# Patient Record
Sex: FEMALE | Race: WHITE | NOT HISPANIC OR LATINO
[De-identification: names, ages, dates, MRNs, and addresses within clinical notes are randomized per-mention and may not be internally consistent; named-entity substitution may affect disease eponyms.]

---

## 2020-11-13 ENCOUNTER — RESULT REVIEW (OUTPATIENT)
Age: 45
End: 2020-11-13

## 2021-08-27 ENCOUNTER — NON-APPOINTMENT (OUTPATIENT)
Age: 46
End: 2021-08-27

## 2021-09-01 PROBLEM — Z00.00 ENCOUNTER FOR PREVENTIVE HEALTH EXAMINATION: Status: ACTIVE | Noted: 2021-09-01

## 2021-09-02 ENCOUNTER — APPOINTMENT (OUTPATIENT)
Dept: OBGYN | Facility: CLINIC | Age: 46
End: 2021-09-02
Payer: MEDICAID

## 2021-09-02 ENCOUNTER — NON-APPOINTMENT (OUTPATIENT)
Age: 46
End: 2021-09-02

## 2021-09-02 VITALS
HEIGHT: 62 IN | WEIGHT: 115 LBS | DIASTOLIC BLOOD PRESSURE: 78 MMHG | BODY MASS INDEX: 21.16 KG/M2 | SYSTOLIC BLOOD PRESSURE: 120 MMHG

## 2021-09-02 DIAGNOSIS — T83.32XA DISPLACEMENT OF INTRAUTERINE CONTRACEPTIVE DEVICE, INITIAL ENCOUNTER: ICD-10-CM

## 2021-09-02 DIAGNOSIS — Z01.419 ENCOUNTER FOR GYNECOLOGICAL EXAMINATION (GENERAL) (ROUTINE) W/OUT ABNORMAL FINDINGS: ICD-10-CM

## 2021-09-02 DIAGNOSIS — Z12.31 ENCOUNTER FOR SCREENING MAMMOGRAM FOR MALIGNANT NEOPLASM OF BREAST: ICD-10-CM

## 2021-09-02 DIAGNOSIS — Z87.39 PERSONAL HISTORY OF OTHER DISEASES OF THE MUSCULOSKELETAL SYSTEM AND CONNECTIVE TISSUE: ICD-10-CM

## 2021-09-02 DIAGNOSIS — Z87.898 PERSONAL HISTORY OF OTHER SPECIFIED CONDITIONS: ICD-10-CM

## 2021-09-02 LAB
DATE COLLECTED: NORMAL
HEMOCCULT SP1 STL QL: NEGATIVE
QUALITY CONTROL: YES

## 2021-09-02 PROCEDURE — 82270 OCCULT BLOOD FECES: CPT

## 2021-09-02 PROCEDURE — 99386 PREV VISIT NEW AGE 40-64: CPT | Mod: GY

## 2021-09-02 PROCEDURE — G0101: CPT

## 2021-09-02 RX ORDER — PREGABALIN 150 MG/1
150 CAPSULE ORAL
Refills: 0 | Status: ACTIVE | COMMUNITY

## 2021-09-02 RX ORDER — PANTOPRAZOLE SODIUM 40 MG/1
40 GRANULE, DELAYED RELEASE ORAL
Refills: 0 | Status: ACTIVE | COMMUNITY

## 2021-09-02 RX ORDER — AMITRIPTYLINE HYDROCHLORIDE 25 MG/1
25 TABLET, FILM COATED ORAL
Refills: 0 | Status: ACTIVE | COMMUNITY

## 2021-09-02 RX ORDER — TIZANIDINE HYDROCHLORIDE 4 MG/1
4 CAPSULE ORAL
Refills: 0 | Status: ACTIVE | COMMUNITY

## 2021-09-02 RX ORDER — MORPHINE SULFATE 15 MG/1
15 TABLET ORAL
Refills: 0 | Status: ACTIVE | COMMUNITY
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
## 2021-09-02 NOTE — PHYSICAL EXAM
[Appropriately responsive] : appropriately responsive [Alert] : alert [No Acute Distress] : no acute distress [Soft] : soft [Non-tender] : non-tender [Non-distended] : non-distended [No HSM] : No HSM [Oriented x3] : oriented x3 [Examination Of The Breasts] : a normal appearance [No Discharge] : no discharge [No Masses] : no breast masses were palpable [Labia Majora] : normal [Labia Minora] : normal [No Bleeding] : There was no active vaginal bleeding [Normal] : normal [Uterine Adnexae] : normal [] : implants [FreeTextEntry9] : Guaiac negative

## 2021-09-02 NOTE — END OF VISIT
[FreeTextEntry3] : I, Marilu Hill, solely acted as a scribe for Dr. Ameriac Shoemaker on 09/02/2021 . All medical entries made by the Scribe were at my, Dr. Shoemaker's, direction and personally dictated by me on 09/02/2021. I have reviewed the chart and agree that the record accurately reflects my personal performance of the history, physical exam, assessment and plan. I have also personally directed, reviewed and agreed with the chart.

## 2021-09-02 NOTE — DISCUSSION/SUMMARY
[FreeTextEntry1] : Pap done today. \par \par STI testing declined \par \par IUD could not be removed due to not visualizing the IUD strings. Multiple attempts made with [facility] in cervical cancal. Will need hysteroscopic removal. Will also check pelvic sono. Rx Cytotec sent to the pharmacy. \par \par Prescription for mammogram screening and breast US given. \par \par Self-breast exam reviewed. \par \par She will follow up in 2 weeks for a hysteroscopic removal of an IUD.

## 2021-09-07 LAB
C TRACH RRNA SPEC QL NAA+PROBE: NOT DETECTED
HPV HIGH+LOW RISK DNA PNL CVX: NOT DETECTED
N GONORRHOEA RRNA SPEC QL NAA+PROBE: NOT DETECTED
SOURCE TP AMPLIFICATION: NORMAL

## 2021-09-11 LAB — CYTOLOGY CVX/VAG DOC THIN PREP: NORMAL

## 2021-09-22 ENCOUNTER — APPOINTMENT (OUTPATIENT)
Dept: OBGYN | Facility: CLINIC | Age: 46
End: 2021-09-22
Payer: MEDICARE

## 2021-09-22 ENCOUNTER — ASOB RESULT (OUTPATIENT)
Age: 46
End: 2021-09-22

## 2021-09-22 VITALS
SYSTOLIC BLOOD PRESSURE: 124 MMHG | DIASTOLIC BLOOD PRESSURE: 68 MMHG | HEIGHT: 62 IN | BODY MASS INDEX: 25.4 KG/M2 | WEIGHT: 138 LBS

## 2021-09-22 DIAGNOSIS — Z11.3 ENCOUNTER FOR SCREENING FOR INFECTIONS WITH A PREDOMINANTLY SEXUAL MODE OF TRANSMISSION: ICD-10-CM

## 2021-09-22 DIAGNOSIS — F17.200 NICOTINE DEPENDENCE, UNSPECIFIED, UNCOMPLICATED: ICD-10-CM

## 2021-09-22 DIAGNOSIS — Z78.9 OTHER SPECIFIED HEALTH STATUS: ICD-10-CM

## 2021-09-22 LAB
HCG UR QL: NEGATIVE
QUALITY CONTROL: YES

## 2021-09-22 PROCEDURE — 76376 3D RENDER W/INTRP POSTPROCES: CPT | Mod: 59

## 2021-09-22 PROCEDURE — 81025 URINE PREGNANCY TEST: CPT

## 2021-09-22 PROCEDURE — 58558Z: CUSTOM

## 2021-09-22 PROCEDURE — 76830 TRANSVAGINAL US NON-OB: CPT

## 2021-09-22 PROCEDURE — 58301 REMOVE INTRAUTERINE DEVICE: CPT | Mod: 59

## 2021-09-22 PROCEDURE — 58300 INSERT INTRAUTERINE DEVICE: CPT | Mod: 59

## 2021-09-22 NOTE — PROCEDURE
[Endometrial Biopsy] : Endometrial biopsy [Irregular Bleeding] : irregular uterine bleeding [Guardian] : guardian [Uterine Perforation] : uterine perforation [Pain] : pain [Cytotec] : Cytotec [Betadine] : Betadine [None] : none [Tenaculum] : Tenaculum [Sounded to ___ cm] : sounded to [unfilled] ~Ucm [Moderate] : moderate [Hysteroscopic Retrieval of IUD] : hysteroscopic retrieval [ IUD] :  IUD [Speculum Placed] : speculum placed [Sent to Pathology] : specimen was placed in buffered formalin and sent for pathology [No Complications] : no complications [Hysteroscopy] : Hysteroscopy [Time out performed] : Pre-procedure time out performed.  Patient's name, date of birth and procedure confirmed. [Risks] : risks [Consent Obtained] : Consent obtained [Benefits] : benefits [Alternatives] : alternatives [Patient] : patient [Infection] : infection [Bleeding] : bleeding [Allergic Reaction] : allergic reaction [flexible] : Using aseptic technique a hysteroscopy was performed using a flexible hysteroscope [Tolerated Well] : Patient tolerated the procedure well [Aftercare instructions/regstrictions given and follow-up scheduled] : Aftercare instructions/restrictions given and follow-up scheduled

## 2021-09-22 NOTE — PHYSICAL EXAM
[Appropriately responsive] : appropriately responsive [No Acute Distress] : no acute distress [Alert] : alert [Oriented x3] : oriented x3

## 2021-09-24 PROBLEM — F17.200 CURRENT SOME DAY SMOKER: Status: ACTIVE | Noted: 2021-09-22

## 2021-09-24 PROBLEM — Z11.3 SCREEN FOR STD (SEXUALLY TRANSMITTED DISEASE): Status: RESOLVED | Noted: 2021-09-02 | Resolved: 2021-09-24

## 2021-09-24 PROBLEM — Z78.9 DENIES ALCOHOL CONSUMPTION: Status: ACTIVE | Noted: 2021-09-22

## 2021-09-27 NOTE — DISCUSSION/SUMMARY
[FreeTextEntry1] : EMB hysteroscopy for irregular bleeding done today. Mirena IUD removed and a new Mirena IUD inserted today without complications. Patient tolerated procedures well. Aftercare instructions given. \par \par Pelvic sono today: Anteverted uterus with the IUD seen insitu with 3D modality. Rt ovarian simple cyst 1.7cm. Lt adnexal complex cystic and solid mass 5.5 cm ?dermoid. No free fluid. \par \par Follow up in 6 weeks for repeat pelvic sonogram

## 2021-09-27 NOTE — HISTORY OF PRESENT ILLNESS
[Gonorrhea test offered] : Gonorrhea test offered [Chlamydia test offered] : Chlamydia test offered [HPV test offered] : HPV test offered [IUD] : has an intrauterine device [N] : Patient denies prior pregnancies [Menarche Age: ____] : age at menarche was [unfilled] [Y] : Patient is sexually active [Currently Active] : currently active [Men] : men [Mammogramdate] : 4/13/21 [TextBox_19] : AS PER PT  [BreastSonogramDate] : 5/03/21 [TextBox_25] : AS PER PT  [PapSmeardate] : 9/02/21 [TextBox_31] : NEG [ColonoscopyDate] : 2/14/01 [TextBox_43] : AS PER PT  [GonorrheaDate] : 9/02/21 [TextBox_63] : NEG [ChlamydiaDate] : 9/02/21 [TextBox_68] : NEG [HPVDate] : 9/02/21 [TextBox_78] : NEG [LMPDate] : 2011 [PGHxTotal] : 0 [FreeTextEntry1] : 2011

## 2021-10-05 LAB — CORE LAB BIOPSY: NORMAL

## 2021-11-11 ENCOUNTER — TRANSCRIPTION ENCOUNTER (OUTPATIENT)
Age: 46
End: 2021-11-11

## 2021-11-11 ENCOUNTER — ASOB RESULT (OUTPATIENT)
Age: 46
End: 2021-11-11

## 2021-11-11 ENCOUNTER — APPOINTMENT (OUTPATIENT)
Dept: OBGYN | Facility: CLINIC | Age: 46
End: 2021-11-11
Payer: MEDICARE

## 2021-11-11 VITALS
DIASTOLIC BLOOD PRESSURE: 80 MMHG | SYSTOLIC BLOOD PRESSURE: 114 MMHG | WEIGHT: 115 LBS | BODY MASS INDEX: 21.16 KG/M2 | HEIGHT: 62 IN

## 2021-11-11 DIAGNOSIS — Z12.11 ENCOUNTER FOR SCREENING FOR MALIGNANT NEOPLASM OF COLON: ICD-10-CM

## 2021-11-11 DIAGNOSIS — Z30.430 ENCOUNTER FOR INSERTION OF INTRAUTERINE CONTRACEPTIVE DEVICE: ICD-10-CM

## 2021-11-11 DIAGNOSIS — R92.2 INCONCLUSIVE MAMMOGRAM: ICD-10-CM

## 2021-11-11 DIAGNOSIS — Z87.898 PERSONAL HISTORY OF OTHER SPECIFIED CONDITIONS: ICD-10-CM

## 2021-11-11 PROCEDURE — 36415 COLL VENOUS BLD VENIPUNCTURE: CPT

## 2021-11-11 PROCEDURE — 99213 OFFICE O/P EST LOW 20 MIN: CPT | Mod: 25

## 2021-11-11 PROCEDURE — 76830 TRANSVAGINAL US NON-OB: CPT

## 2021-11-11 PROCEDURE — 76376 3D RENDER W/INTRP POSTPROCES: CPT | Mod: 59

## 2021-11-13 PROBLEM — Z12.11 COLON CANCER SCREENING: Status: RESOLVED | Noted: 2021-09-02 | Resolved: 2021-11-13

## 2021-11-13 PROBLEM — Z87.898 HISTORY OF INTERMENSTRUAL BLEEDING: Status: RESOLVED | Noted: 2021-09-22 | Resolved: 2021-11-13

## 2021-11-13 PROBLEM — R92.2 DENSE BREAST TISSUE ON MAMMOGRAM: Status: RESOLVED | Noted: 2021-09-02 | Resolved: 2021-11-13

## 2021-11-13 PROBLEM — Z30.430 ENCOUNTER FOR INSERTION OF MIRENA IUD: Status: RESOLVED | Noted: 2021-09-22 | Resolved: 2021-11-13

## 2021-11-13 NOTE — DISCUSSION/SUMMARY
[FreeTextEntry1] : On sonogram, a retroverted uterus with the IUD seen insitu with 3D modality. Rt ovary appears within normal limits. Lt adnexal echogenic mass 5.9 cm and cystic mass 3 cm. Dermoid vs ovary mass. No free fluid.\par \par Suspect dermoid.\par \par Recommended to remove the left ovary. Consult with Dr. Read will be scheduled. \par \par KINGSTON blood work collected today. If normal, will check pelvic MRI. If abnormal, will send directly to gyn/onc.\par

## 2021-11-13 NOTE — HISTORY OF PRESENT ILLNESS
[Gonorrhea test offered] : Gonorrhea test offered [Chlamydia test offered] : Chlamydia test offered [HPV test offered] : HPV test offered [IUD] : has an intrauterine device [Y] : Patient is sexually active [N] : Patient denies prior pregnancies [Menarche Age: ____] : age at menarche was [unfilled] [No] : Patient does not have concerns regarding sex [Currently Active] : currently active [Men] : men [Vaginal] : vaginal [Yes] : Yes [FreeTextEntry1] : MARIELA 47 yo  LMP unknown is here today for a sonogram and a follow up.\par \par Denies any pain. [TextBox_4] : PT PRESENTS FOR FOLLOW UP SONOGRAM TO REVIEW RESULTS [Mammogramdate] : 4/13/21 [TextBox_19] : PER PATIENT [BreastSonogramDate] : 5/3/21 [TextBox_25] : PER PATIENT [PapSmeardate] : 9/2/21 [TextBox_31] : NEG [ColonoscopyDate] : 2/14/01 [TextBox_43] : PER PATIENT [GonorrheaDate] : 09/2/21 [TextBox_63] : NEG [ChlamydiaDate] : 09/2/21 [HPVDate] : 09/2/21 [TextBox_68] : NEG [TextBox_78] : NEG [de-identified] : MIRENA IUD PLACED ON 9/22/21 [PGHxTotal] : 0 [FreeTextEntry3] : MIRENA IUD

## 2021-11-13 NOTE — END OF VISIT
[FreeTextEntry3] : I, [Rico Talavera] solely acted as scribe for Dr. America Shoemaker on 11/11/2021 \par All medical entries made by the Scribe were at my, Dr. Shoemaker’s, direction and personally\par dictated by me on 11/11/2021 . I have reviewed the chart and agree that the record\par accurately reflects my personal performance of the history, physical exam, assessment and plan. I\par have also personally directed, reviewed, and agreed with the chart.

## 2021-11-22 ENCOUNTER — APPOINTMENT (OUTPATIENT)
Dept: OBGYN | Facility: CLINIC | Age: 46
End: 2021-11-22
Payer: MEDICARE

## 2021-11-22 VITALS
WEIGHT: 133 LBS | SYSTOLIC BLOOD PRESSURE: 122 MMHG | HEIGHT: 62 IN | DIASTOLIC BLOOD PRESSURE: 70 MMHG | BODY MASS INDEX: 24.48 KG/M2

## 2021-11-22 PROCEDURE — 99214 OFFICE O/P EST MOD 30 MIN: CPT

## 2021-11-27 NOTE — HISTORY OF PRESENT ILLNESS
[LMP unknown] : LMP unknown [IUD] : has an intrauterine device [Y] : Patient is sexually active [N] : Patient denies prior pregnancies [unknown] : Patient is unsure of the date of her LMP [Menarche Age: ____] : age at menarche was [unfilled] [No] : Patient does not have concerns regarding sex [Currently Active] : currently active [Men] : men [Vaginal] : vaginal [Yes] : Yes [FreeTextEntry1] : MARIELA presents today for a surgical consult as per Dr. Shoemaker. Suspected dermoid noted from sonogram at last vist.  [TextBox_4] : Pt presents for a surgical consult [Papeardate] : 09/02/2021 [TextBox_31] : NORMAL [GonorrheaDate] : 09/02/2021 [TextBox_63] : NEG [ChlamydiaDate] : 09/02/2021 [TextBox_68] : NEG [HPVDate] : 09/02/2021 [TextBox_78] : NEG [de-identified] : MIRENA [PGHxTotal] : 0 [FreeTextEntry3] : MIRENA

## 2021-11-27 NOTE — END OF VISIT
[FreeTextEntry3] : I, [Rico Talavera] solely acted as scribe for Dr. Leo Read on 11/22/2021.\par All medical entries made by the Scribe were at my, Dr. Read’s, direction and personally\par dictated by me on 11/22/2021.  I have reviewed the chart and agree that the record\par accurately reflects my personal performance of the history, physical exam, assessment and plan. I\par have also personally directed, reviewed, and agreed with the chart.

## 2021-12-01 ENCOUNTER — NON-APPOINTMENT (OUTPATIENT)
Age: 46
End: 2021-12-01

## 2021-12-02 ENCOUNTER — NON-APPOINTMENT (OUTPATIENT)
Age: 46
End: 2021-12-02

## 2021-12-03 ENCOUNTER — NON-APPOINTMENT (OUTPATIENT)
Age: 46
End: 2021-12-03

## 2021-12-03 LAB
CA 125 (LABCORP): 8.3 U/ML
HE4X: 93.4 PMOL/L
POSTMENOPAUSAL ROMA: 1.39
PREMENOPAUSAL ROMA: 2.55
ROMA COMMENT: NORMAL

## 2021-12-08 ENCOUNTER — APPOINTMENT (OUTPATIENT)
Dept: GYNECOLOGIC ONCOLOGY | Facility: CLINIC | Age: 46
End: 2021-12-08
Payer: MEDICARE

## 2021-12-08 DIAGNOSIS — Z80.3 FAMILY HISTORY OF MALIGNANT NEOPLASM OF BREAST: ICD-10-CM

## 2021-12-08 PROCEDURE — 76857 US EXAM PELVIC LIMITED: CPT | Mod: 59

## 2021-12-08 PROCEDURE — 76830 TRANSVAGINAL US NON-OB: CPT | Mod: 59

## 2021-12-08 PROCEDURE — 99204 OFFICE O/P NEW MOD 45 MIN: CPT | Mod: 25

## 2021-12-14 NOTE — ASSESSMENT
[FreeTextEntry1] : 47yo female with left adnexal mass ?dermoid and elevated KINGSTON. Discussed with patient that given her elevated KINGSTON and the appearance of this mass, surgical excision is recommended. Option of Lap LO, BS vs Lap LO, BS hysterectomy vs Lap LO, BS, possible staging possible hysterectomy. Pt would like to proceed with just removal of left tube and ovary and if a malignancy is found then staging and hysterectomy. \par \par I discussed at length with the patient the nature, purpose, risks, benefits, and alternatives of laparoscopic left oophorectomy, bilateral salpingectomy, possible hysterectomy, possible right oophorectomy, possible staging (including possible omentectomy).  The patient understands the risks to include,but not be limited to, bowel injury, bleeding (with the possible need for transfusion), bladder or ureteral injury, infections, deep venous thrombosis, and kimmy-operative death.  The patient understands the utility of intraoperative frozen section to determine whether surgical staging will be performed. The patient also understands that her surgery may not be able to be performed robotically and that she may need a laparotomy.  She also understands the limitations of robotic surgery and the possibility of missing a surgical complication with need for subsequent re-exploration.  She agrees to proceed.  She asked numerous questions which were answered to her satisfaction.  She understands the need for a pre-operative bowel preparation and agrees to comply with our instructions.  She also understands the rationale for a cystoscopy at the completion of the procedure and the potential risks of cystoscopy.  The patient also understands the possible limitations of frozen section and the limitations of robotic staging compared to a laparotomy approach.\par

## 2021-12-14 NOTE — PHYSICAL EXAM
[Chaperone Present] : A chaperone was present in the examining room during all aspects of the physical examination [Abnormal] : Adnexa(ae): Abnormal [Normal] : Bimanual Exam: Normal [FreeTextEntry1] : Patient was interviewed and examined with chaperone present. Name of chaperone: Haley Suarez  [de-identified] : smooth left adnexal mass

## 2021-12-14 NOTE — END OF VISIT
[FreeTextEntry3] : Written by Haley TORIBIO, acting as a scribe for Dr. Abraham Britt.\par This note accurately reflects the work and decisions made by me.\par

## 2021-12-14 NOTE — CHIEF COMPLAINT
[FreeTextEntry1] : [facility]\par \par [facility]\par [address]\par [place] [address] \par [phone] \par \par

## 2021-12-14 NOTE — HISTORY OF PRESENT ILLNESS
[FreeTextEntry1] : This 45yo G0 LMP 10 years ago referred by Dr. Shoemaker for evaluation of an ovarian cyst and elevated KINGSTON. Patient had a routine pelvic US in Sept for removal of IUD and a left adnexal complex cystic and solid mass 5.5cm seen ? dermoid. A follow up sono on 11/21/21 revealed RV uterus with IUD, left adnexal echogenic mass 5.9cm and cystic mass 3cm ?dermoid, free fluid. Premenopausal KINGSTON on 11/11/21 was elevated at 2.55. \par \par Pt denies pelvic pain, n/v, bloating or change in bowel or bladder habits. She has a Mirena IUD in place. Denies abnormal bleeding. \par \par Family history of breast cancer in her mat GM and mat aunt. 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  \par \par Health maintenance:\par \par Pap smear-9/2/21- wnl \par Mammo-4/2021-reports wnl \par Colonoscopy-years ago \par \par

## 2022-01-01 NOTE — DISCUSSION/SUMMARY
[FreeTextEntry1] : -We discussed and reviewed the results of the pelvic sonogram ordered by Dr. Shoemaker. \par \par -Recommended to remove her left ovary at her age. Advised right ovary will be examined and the option of removing it or leaving it was discussed. . Patient desires a tubal ligation while in surgery. Procedure details were discussed today. Patient was advised the procedure can be done at [facility] or . \par \par -Advised patient there is a risk of ovarian torsion. \par \par -Surgical scheduler will meet with patient to schedule a date for surgery. \par \par -All questions and concerns were discussed.\par \par During this visit 20 minutes were spent face-to-face with greater than 50% of the time dedicated\par to counseling.
alert and awake

## 2022-01-13 ENCOUNTER — APPOINTMENT (OUTPATIENT)
Dept: OBGYN | Facility: CLINIC | Age: 47
End: 2022-01-13

## 2022-01-27 ENCOUNTER — APPOINTMENT (OUTPATIENT)
Dept: OBGYN | Facility: CLINIC | Age: 47
End: 2022-01-27

## 2022-01-29 ENCOUNTER — TRANSCRIPTION ENCOUNTER (OUTPATIENT)
Age: 47
End: 2022-01-29

## 2022-02-07 ENCOUNTER — FORM ENCOUNTER (OUTPATIENT)
Age: 47
End: 2022-02-07

## 2022-02-10 ENCOUNTER — RESULT REVIEW (OUTPATIENT)
Age: 47
End: 2022-02-10

## 2022-02-15 ENCOUNTER — NON-APPOINTMENT (OUTPATIENT)
Age: 47
End: 2022-02-15

## 2022-02-17 ENCOUNTER — NON-APPOINTMENT (OUTPATIENT)
Age: 47
End: 2022-02-17

## 2022-02-24 ENCOUNTER — APPOINTMENT (OUTPATIENT)
Dept: GYNECOLOGIC ONCOLOGY | Facility: CLINIC | Age: 47
End: 2022-02-24
Payer: MEDICARE

## 2022-02-24 VITALS — HEIGHT: 62 IN | BODY MASS INDEX: 22.45 KG/M2 | WEIGHT: 122 LBS

## 2022-02-24 PROCEDURE — 99212 OFFICE O/P EST SF 10 MIN: CPT
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
## 2022-02-24 NOTE — DISCUSSION/SUMMARY
[Clean] : was clean [Dry] : was dry [Intact] : was intact [Erythema] : was not erythematous [Ecchymosis] : was not ecchymotic [Seroma] : had no seroma [None] : had no drainage [Normal Skin] : normal appearance [Firm] : soft [Tender] : nontender [Abnormal Bowel Sounds] : normal bowel sounds [Guarding] : no guarding [Rebound] : no rebound tenderness [Incisional Hernia] : no incisional hernia [Mass] : no palpable mass [Doing Well] : is doing well [Excellent Pain Control] : has excellent pain control [No Sign of Infection] : is showing no signs of infection

## 2022-02-24 NOTE — ASSESSMENT
[FreeTextEntry1] : Pt is a 47 yo s/p Lap BS LO on 2/10/22 due to left dermoid cyst. Pathology with no immature elements. Recovered well.

## 2022-03-18 ENCOUNTER — TRANSCRIPTION ENCOUNTER (OUTPATIENT)
Age: 47
End: 2022-03-18

## 2022-08-27 ENCOUNTER — NON-APPOINTMENT (OUTPATIENT)
Age: 47
End: 2022-08-27

## 2022-09-02 ENCOUNTER — APPOINTMENT (OUTPATIENT)
Dept: OBGYN | Facility: CLINIC | Age: 47
End: 2022-09-02

## 2022-09-02 VITALS
BODY MASS INDEX: 26.87 KG/M2 | WEIGHT: 146 LBS | DIASTOLIC BLOOD PRESSURE: 101 MMHG | SYSTOLIC BLOOD PRESSURE: 145 MMHG | HEIGHT: 62 IN

## 2022-09-02 DIAGNOSIS — Z12.31 ENCOUNTER FOR SCREENING MAMMOGRAM FOR MALIGNANT NEOPLASM OF BREAST: ICD-10-CM

## 2022-09-02 PROCEDURE — 99396 PREV VISIT EST AGE 40-64: CPT

## 2022-09-02 PROCEDURE — 82270 OCCULT BLOOD FECES: CPT

## 2022-09-05 LAB
DATE COLLECTED: NORMAL
HEMOCCULT SP1 STL QL: NEGATIVE
HPV HIGH+LOW RISK DNA PNL CVX: NOT DETECTED
QUALITY CONTROL: YES

## 2022-09-05 NOTE — HISTORY OF PRESENT ILLNESS
[IUD] : has an intrauterine device [Y] : Patient is sexually active [N] : Patient denies prior pregnancies [Menarche Age: ____] : age at menarche was [unfilled] [LMP unknown] : LMP unknown [unknown] : Patient is unsure of the date of her LMP [Currently Active] : currently active [FreeTextEntry1] : \par 47 yo LMP unsure secondary to IUD here for an annual exam.  [Mammogramdate] : 6/2022 [BreastSonogramDate] : 6/2022 [PapSmeardate] : 9/2/21 [TextBox_31] : WNL [ColonoscopyDate] : 15 years ago [GonorrheaDate] : 9/2/21 [TextBox_63] : NEG [ChlamydiaDate] : 9/2/21 [TextBox_68] : NEG [HPVDate] : 9/2/21 [TextBox_78] : NEG [de-identified] : TRISTAN 9/22/21 [PGHxTotal] : 0

## 2022-09-06 ENCOUNTER — NON-APPOINTMENT (OUTPATIENT)
Age: 47
End: 2022-09-06

## 2022-09-08 ENCOUNTER — APPOINTMENT (OUTPATIENT)
Dept: OBGYN | Facility: CLINIC | Age: 47
End: 2022-09-08

## 2022-09-10 LAB — CYTOLOGY CVX/VAG DOC THIN PREP: NORMAL

## 2022-09-12 ENCOUNTER — TRANSCRIPTION ENCOUNTER (OUTPATIENT)
Age: 47
End: 2022-09-12

## 2022-10-10 ENCOUNTER — APPOINTMENT (OUTPATIENT)
Dept: OPHTHALMOLOGY | Facility: CLINIC | Age: 47
End: 2022-10-10

## 2022-10-10 ENCOUNTER — NON-APPOINTMENT (OUTPATIENT)
Age: 47
End: 2022-10-10

## 2022-10-10 PROCEDURE — 92004 COMPRE OPH EXAM NEW PT 1/>: CPT

## 2023-01-26 ENCOUNTER — APPOINTMENT (OUTPATIENT)
Dept: OPHTHALMOLOGY | Facility: CLINIC | Age: 48
End: 2023-01-26
Payer: MEDICARE

## 2023-01-26 ENCOUNTER — NON-APPOINTMENT (OUTPATIENT)
Age: 48
End: 2023-01-26

## 2023-01-26 PROCEDURE — 99213 OFFICE O/P EST LOW 20 MIN: CPT

## 2023-06-10 ENCOUNTER — NON-APPOINTMENT (OUTPATIENT)
Age: 48
End: 2023-06-10

## 2023-06-13 ENCOUNTER — TRANSCRIPTION ENCOUNTER (OUTPATIENT)
Age: 48
End: 2023-06-13

## 2023-07-05 ENCOUNTER — TRANSCRIPTION ENCOUNTER (OUTPATIENT)
Age: 48
End: 2023-07-05

## 2023-08-04 ENCOUNTER — TRANSCRIPTION ENCOUNTER (OUTPATIENT)
Age: 48
End: 2023-08-04

## 2023-08-30 ENCOUNTER — NON-APPOINTMENT (OUTPATIENT)
Age: 48
End: 2023-08-30

## 2023-09-27 ENCOUNTER — NON-APPOINTMENT (OUTPATIENT)
Age: 48
End: 2023-09-27

## 2023-09-27 ENCOUNTER — APPOINTMENT (OUTPATIENT)
Dept: OPHTHALMOLOGY | Facility: CLINIC | Age: 48
End: 2023-09-27
Payer: MEDICARE

## 2023-09-27 PROCEDURE — 92133 CPTRZD OPH DX IMG PST SGM ON: CPT

## 2023-09-27 PROCEDURE — 92014 COMPRE OPH EXAM EST PT 1/>: CPT

## 2023-10-18 ENCOUNTER — LABORATORY RESULT (OUTPATIENT)
Age: 48
End: 2023-10-18

## 2023-10-18 ENCOUNTER — NON-APPOINTMENT (OUTPATIENT)
Age: 48
End: 2023-10-18

## 2023-10-18 ENCOUNTER — APPOINTMENT (OUTPATIENT)
Dept: OBGYN | Facility: CLINIC | Age: 48
End: 2023-10-18
Payer: MEDICARE

## 2023-10-18 DIAGNOSIS — R92.333 MAMMOGRAPHIC HETEROGENEOUS DENSITY, BILATERAL BREASTS: ICD-10-CM

## 2023-10-18 DIAGNOSIS — Z01.419 ENCOUNTER FOR GYNECOLOGICAL EXAMINATION (GENERAL) (ROUTINE) W/OUT ABNORMAL FINDINGS: ICD-10-CM

## 2023-10-18 DIAGNOSIS — Z12.31 ENCOUNTER FOR SCREENING MAMMOGRAM FOR MALIGNANT NEOPLASM OF BREAST: ICD-10-CM

## 2023-10-18 DIAGNOSIS — N94.89 OTHER SPECIFIED CONDITIONS ASSOCIATED WITH FEMALE GENITAL ORGANS AND MENSTRUAL CYCLE: ICD-10-CM

## 2023-10-18 DIAGNOSIS — R92.30 DENSE BREASTS, UNSPECIFIED: ICD-10-CM

## 2023-10-18 DIAGNOSIS — Z12.11 ENCOUNTER FOR SCREENING FOR MALIGNANT NEOPLASM OF COLON: ICD-10-CM

## 2023-10-18 DIAGNOSIS — T83.32XA DISPLACEMENT OF INTRAUTERINE CONTRACEPTIVE DEVICE, INITIAL ENCOUNTER: ICD-10-CM

## 2023-10-18 PROCEDURE — 99396 PREV VISIT EST AGE 40-64: CPT | Mod: GY

## 2023-10-18 RX ORDER — NEBIVOLOL HYDROCHLORIDE 10 MG/1
10 TABLET ORAL
Refills: 0 | Status: ACTIVE | COMMUNITY

## 2023-10-18 RX ORDER — FAMOTIDINE 40 MG
40 TABLET,DISINTEGRATING ORAL
Refills: 0 | Status: ACTIVE | COMMUNITY

## 2023-10-18 RX ORDER — BUDESONIDE AND FORMOTEROL FUMARATE DIHYDRATE 160; 4.5 UG/1; UG/1
AEROSOL RESPIRATORY (INHALATION)
Refills: 0 | Status: ACTIVE | COMMUNITY

## 2023-11-05 LAB
CYTOLOGY CVX/VAG DOC THIN PREP: ABNORMAL
HPV HIGH+LOW RISK DNA PNL CVX: DETECTED

## 2023-11-08 ENCOUNTER — NON-APPOINTMENT (OUTPATIENT)
Age: 48
End: 2023-11-08

## 2023-11-09 ENCOUNTER — NON-APPOINTMENT (OUTPATIENT)
Age: 48
End: 2023-11-09

## 2023-11-13 ENCOUNTER — NON-APPOINTMENT (OUTPATIENT)
Age: 48
End: 2023-11-13

## 2023-11-15 ENCOUNTER — NON-APPOINTMENT (OUTPATIENT)
Age: 48
End: 2023-11-15

## 2023-12-13 ENCOUNTER — LABORATORY RESULT (OUTPATIENT)
Age: 48
End: 2023-12-13

## 2023-12-13 ENCOUNTER — APPOINTMENT (OUTPATIENT)
Dept: OBGYN | Facility: CLINIC | Age: 48
End: 2023-12-13
Payer: MEDICARE

## 2023-12-13 VITALS
DIASTOLIC BLOOD PRESSURE: 78 MMHG | HEIGHT: 62 IN | BODY MASS INDEX: 28.34 KG/M2 | WEIGHT: 154 LBS | SYSTOLIC BLOOD PRESSURE: 132 MMHG

## 2023-12-13 PROCEDURE — 99215 OFFICE O/P EST HI 40 MIN: CPT

## 2023-12-13 PROCEDURE — 36415 COLL VENOUS BLD VENIPUNCTURE: CPT

## 2023-12-13 RX ORDER — METHIMAZOLE 5 MG/1
5 TABLET ORAL
Refills: 0 | Status: ACTIVE | COMMUNITY

## 2023-12-13 RX ORDER — METOPROLOL SUCCINATE 100 MG/1
TABLET, EXTENDED RELEASE ORAL
Refills: 0 | Status: DISCONTINUED | COMMUNITY
End: 2023-12-13

## 2023-12-13 RX ORDER — PHENTERMINE HYDROCHLORIDE 15 MG/1
15 CAPSULE ORAL
Qty: 30 | Refills: 0 | Status: ACTIVE | COMMUNITY
Start: 2023-12-13 | End: 1900-01-01

## 2023-12-13 RX ORDER — LOSARTAN POTASSIUM AND HYDROCHLOROTHIAZIDE 12.5; 5 MG/1; MG/1
50-12.5 TABLET ORAL
Refills: 0 | Status: DISCONTINUED | COMMUNITY
End: 2023-12-13

## 2023-12-13 RX ORDER — MISOPROSTOL 200 UG/1
200 TABLET ORAL
Qty: 2 | Refills: 0 | Status: DISCONTINUED | COMMUNITY
Start: 2021-09-21 | End: 2023-12-13

## 2023-12-21 ENCOUNTER — APPOINTMENT (OUTPATIENT)
Dept: OBGYN | Facility: CLINIC | Age: 48
End: 2023-12-21
Payer: MEDICARE

## 2023-12-21 VITALS
BODY MASS INDEX: 28.34 KG/M2 | DIASTOLIC BLOOD PRESSURE: 80 MMHG | WEIGHT: 154 LBS | HEIGHT: 62 IN | SYSTOLIC BLOOD PRESSURE: 148 MMHG

## 2023-12-21 DIAGNOSIS — Z01.419 ENCOUNTER FOR GYNECOLOGICAL EXAMINATION (GENERAL) (ROUTINE) W/OUT ABNORMAL FINDINGS: ICD-10-CM

## 2023-12-21 DIAGNOSIS — R87.810 ATYPICAL SQUAMOUS CELLS OF UNDETERMINED SIGNIFICANCE ON CYTOLOGIC SMEAR OF CERVIX (ASC-US): ICD-10-CM

## 2023-12-21 DIAGNOSIS — R87.610 ATYPICAL SQUAMOUS CELLS OF UNDETERMINED SIGNIFICANCE ON CYTOLOGIC SMEAR OF CERVIX (ASC-US): ICD-10-CM

## 2023-12-21 LAB
HCG UR QL: NEGATIVE
QUALITY CONTROL: YES

## 2023-12-21 PROCEDURE — 36415 COLL VENOUS BLD VENIPUNCTURE: CPT

## 2023-12-21 PROCEDURE — 99213 OFFICE O/P EST LOW 20 MIN: CPT

## 2023-12-21 PROCEDURE — 81025 URINE PREGNANCY TEST: CPT

## 2023-12-22 LAB
PROLACTIN SERPL-MCNC: 13.1 NG/ML
TSH SERPL-ACNC: 1.08 UIU/ML

## 2023-12-27 DIAGNOSIS — R79.89 OTHER SPECIFIED ABNORMAL FINDINGS OF BLOOD CHEMISTRY: ICD-10-CM

## 2023-12-27 PROBLEM — Z01.419 ENCOUNTER FOR WELL WOMAN EXAM WITH ROUTINE GYNECOLOGICAL EXAM: Status: RESOLVED | Noted: 2023-10-18 | Resolved: 2023-12-27

## 2023-12-27 LAB
24R-OH-CALCIDIOL SERPL-MCNC: 49.9 PG/ML
ALT SERPL-CCNC: 10 U/L
ANION GAP SERPL CALC-SCNC: 19 MMOL/L
AST SERPL-CCNC: 14 U/L
CHLORIDE SERPL-SCNC: 101 MMOL/L
CHOLEST SERPL-MCNC: 213 MG/DL
CO2 SERPL-SCNC: 20 MMOL/L
CRP SERPL-MCNC: 10 MG/L
DHEA-SULFATE, SERUM: 40 UG/DL
ESTIMATED AVERAGE GLUCOSE: 103 MG/DL
ESTROGEN SERPL-MCNC: 777 PG/ML
FSH SERPL-MCNC: 20.7 IU/L
HBA1C MFR BLD HPLC: 5.2 %
HDLC SERPL-MCNC: 79 MG/DL
INSULIN SERPL-MCNC: 4.3 UU/ML
LDLC SERPL DIRECT ASSAY-MCNC: 125 MG/DL
LH SERPL-ACNC: 26.2 IU/L
POTASSIUM SERPL-SCNC: 5.2 MMOL/L
PROGEST SERPL-MCNC: 0.5 NG/ML
PROLACTIN SERPL-MCNC: 44.2 NG/ML
SODIUM SERPL-SCNC: 140 MMOL/L
T3 SERPL-MCNC: 128 NG/DL
TESTOST SERPL-MCNC: <2.5 NG/DL
TSH SERPL-ACNC: 7.09 UIU/ML
VIT B12 SERPL-MCNC: 420 PG/ML

## 2023-12-27 NOTE — END OF VISIT
[FreeTextEntry3] : I, Ankita Mercado, solely acted as scribe for Dr. America Shoemaker on 12/21/2023. All medical entries made by the Scribe were at my, Dr. Shoemaker's, direction and personally dictated by me on 12/21/2023. I have reviewed the chart and agree that the record accurately reflects my personal performance of the history, physical exam, assessment and plan. I have also personally directed, reviewed, and agreed with the chart.
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
## 2023-12-27 NOTE — DISCUSSION/SUMMARY
[FreeTextEntry1] : Discussed previous bloodwork results with patient today including elevated prolactin.   Repeat Prolactin and TSH bloodwork today.  Patient declines colposcopy today and would prefer to repeat a pap smear yearly instead. She is aware of the risks.  F/u annually and as needed.

## 2023-12-27 NOTE — HISTORY OF PRESENT ILLNESS
[N] : Patient reports normal menses [IUD] : has an intrauterine device [Y] : Patient is sexually active [Menarche Age: ____] : age at menarche was [unfilled] [Currently Active] : currently active [FreeTextEntry1] : MARIELA 49 yo  LMP unknown is here today for a colposcopy secondary to positive HPV & ASCUS.  Patient declines colposcopy today and would prefer to repeat a pap smear yearly instead. She is aware of the risks. [Mammogramdate] : 06/16/23 [TextBox_19] : BR2 [BreastSonogramDate] : 06/16/23 [TextBox_25] : BR2 [PapSmeardate] : 10/18/23 [TextBox_31] : ASC-US [ColonoscopyDate] : 2022  [TextBox_43] : PER PT [HPVDate] : 10/18/23 [TextBox_78] : POS, HPV16&18/45-NEG [de-identified] : TRISTAN (09/22/2021)

## 2024-01-26 ENCOUNTER — APPOINTMENT (OUTPATIENT)
Dept: OBGYN | Facility: CLINIC | Age: 49
End: 2024-01-26
Payer: MEDICARE

## 2024-01-26 VITALS
SYSTOLIC BLOOD PRESSURE: 122 MMHG | HEIGHT: 62 IN | DIASTOLIC BLOOD PRESSURE: 78 MMHG | WEIGHT: 154 LBS | BODY MASS INDEX: 28.34 KG/M2

## 2024-01-26 DIAGNOSIS — Z97.5 PRESENCE OF (INTRAUTERINE) CONTRACEPTIVE DEVICE: ICD-10-CM

## 2024-01-26 PROCEDURE — 99213 OFFICE O/P EST LOW 20 MIN: CPT

## 2024-02-23 ENCOUNTER — APPOINTMENT (OUTPATIENT)
Dept: OBGYN | Facility: CLINIC | Age: 49
End: 2024-02-23

## 2024-03-07 PROBLEM — Z97.5 IUD (INTRAUTERINE DEVICE) IN PLACE: Status: ACTIVE | Noted: 2021-09-02

## 2024-03-07 NOTE — ASSESSMENT
[FreeTextEntry1] : MARIELA JAIMES  is here for an appointment for a followup of weight loss medications.    BARIATRIC SURGERY HISTORY:none OBESITY COMORBIDITIES: none CURRENT ANTI-OBESITY MEDICATIONS: OBESITY MEDICATION SIDE EFFECTS: HISTORY OF WEIGHT LOSS MEDICATIONS:   Goal Weight: Patient Current weight: 154-->154 BMI:28.17   Portion size: normal size according to patient, Breakfast: carnation instant breakfast shakes Lunch: avocado/ trail mix Dinner: chicken,vegetables, (broccoli, carrots, yam), pizza 1x/weekly Snacks: stopped snacking on the all the candy, 3 fun size chocolate, fruits Drinks: water, sugar free soda ( 3x weekly),    Exercise: cardio 3-4x/ week, training with specialist Occupation: unemployed  Plan of Care:  Whole food eating based strategy. Increase diversity of plants/fiber in diet Stressed regular scheduled meal w/ snacks.  Insurance does not cover any weight loss medication.  To continue phentermine out of pocket.

## 2024-03-08 ENCOUNTER — APPOINTMENT (OUTPATIENT)
Dept: OBGYN | Facility: CLINIC | Age: 49
End: 2024-03-08
Payer: MEDICARE

## 2024-03-08 VITALS
WEIGHT: 148.4 LBS | HEIGHT: 62 IN | BODY MASS INDEX: 27.31 KG/M2 | DIASTOLIC BLOOD PRESSURE: 90 MMHG | SYSTOLIC BLOOD PRESSURE: 130 MMHG

## 2024-03-08 DIAGNOSIS — I10 ESSENTIAL (PRIMARY) HYPERTENSION: ICD-10-CM

## 2024-03-08 DIAGNOSIS — E66.3 OVERWEIGHT: ICD-10-CM

## 2024-03-08 DIAGNOSIS — E66.9 OVERWEIGHT: ICD-10-CM

## 2024-03-08 PROCEDURE — 99213 OFFICE O/P EST LOW 20 MIN: CPT

## 2024-03-08 RX ORDER — PHENTERMINE HYDROCHLORIDE 37.5 MG/1
37.5 CAPSULE ORAL
Qty: 30 | Refills: 0 | Status: ACTIVE | COMMUNITY
Start: 2024-01-26 | End: 1900-01-01

## 2024-03-08 NOTE — ASSESSMENT
[FreeTextEntry1] : MARIELA JAIMES  is here for an appointment for a followup of weight loss medications.    BARIATRIC SURGERY HISTORY:none OBESITY COMORBIDITIES: none CURRENT ANTI-OBESITY MEDICATIONS: phentermine OBESITY MEDICATION SIDE EFFECTS: HISTORY OF WEIGHT LOSS MEDICATIONS:  Patient Current weight: 154-->154-->148 BMI:28.17-->27.1   Portion size: normal size according to patient, Breakfast: carnation instant breakfast shakes, having issues being consistent with breakfast, english muffin w/ PB Lunch: avocado/ trail mix/fruit/cottage cheese Dinner: chicken,vegetables, (broccoli, carrots, yam), pizza 1x/weekly Snacks: stopped snacking on the all the candy, nothing after dinner Drinks: water, sugar free soda ( 3x weekly),    Exercise: cardio 3-4x/ week, Occupation: unemployed  Plan of Care:  Whole food eating based strategy. Increase diversity of plants/fiber in diet Stressed regular scheduled meal w/ snacks.  To continue phentermine out of pocket patient aware of being used off label. Will plan to d/c by May 2024.

## 2024-04-19 ENCOUNTER — APPOINTMENT (OUTPATIENT)
Dept: OBGYN | Facility: CLINIC | Age: 49
End: 2024-04-19

## 2024-06-28 ENCOUNTER — RESULT REVIEW (OUTPATIENT)
Age: 49
End: 2024-06-28

## 2024-10-24 ENCOUNTER — NON-APPOINTMENT (OUTPATIENT)
Age: 49
End: 2024-10-24

## 2024-10-24 ENCOUNTER — APPOINTMENT (OUTPATIENT)
Dept: OPHTHALMOLOGY | Facility: CLINIC | Age: 49
End: 2024-10-24
Payer: MEDICARE

## 2024-10-24 PROCEDURE — 92014 COMPRE OPH EXAM EST PT 1/>: CPT

## 2024-11-15 ENCOUNTER — APPOINTMENT (OUTPATIENT)
Dept: OBGYN | Facility: CLINIC | Age: 49
End: 2024-11-15
Payer: MEDICARE

## 2024-11-15 ENCOUNTER — LABORATORY RESULT (OUTPATIENT)
Age: 49
End: 2024-11-15

## 2024-11-15 VITALS
SYSTOLIC BLOOD PRESSURE: 116 MMHG | WEIGHT: 142 LBS | DIASTOLIC BLOOD PRESSURE: 70 MMHG | BODY MASS INDEX: 26.13 KG/M2 | HEIGHT: 62 IN

## 2024-11-15 DIAGNOSIS — Z97.5 PRESENCE OF (INTRAUTERINE) CONTRACEPTIVE DEVICE: ICD-10-CM

## 2024-11-15 DIAGNOSIS — Z12.31 ENCOUNTER FOR SCREENING MAMMOGRAM FOR MALIGNANT NEOPLASM OF BREAST: ICD-10-CM

## 2024-11-15 DIAGNOSIS — Z82.49 FAMILY HISTORY OF ISCHEMIC HEART DISEASE AND OTHER DISEASES OF THE CIRCULATORY SYSTEM: ICD-10-CM

## 2024-11-15 DIAGNOSIS — Z80.1 FAMILY HISTORY OF MALIGNANT NEOPLASM OF TRACHEA, BRONCHUS AND LUNG: ICD-10-CM

## 2024-11-15 DIAGNOSIS — R92.30 DENSE BREASTS, UNSPECIFIED: ICD-10-CM

## 2024-11-15 DIAGNOSIS — Z78.9 OTHER SPECIFIED HEALTH STATUS: ICD-10-CM

## 2024-11-15 DIAGNOSIS — R87.610 ATYPICAL SQUAMOUS CELLS OF UNDETERMINED SIGNIFICANCE ON CYTOLOGIC SMEAR OF CERVIX (ASC-US): ICD-10-CM

## 2024-11-15 DIAGNOSIS — Z01.419 ENCOUNTER FOR GYNECOLOGICAL EXAMINATION (GENERAL) (ROUTINE) W/OUT ABNORMAL FINDINGS: ICD-10-CM

## 2024-11-15 DIAGNOSIS — Z83.3 FAMILY HISTORY OF DIABETES MELLITUS: ICD-10-CM

## 2024-11-15 DIAGNOSIS — R87.810 ATYPICAL SQUAMOUS CELLS OF UNDETERMINED SIGNIFICANCE ON CYTOLOGIC SMEAR OF CERVIX (ASC-US): ICD-10-CM

## 2024-11-15 DIAGNOSIS — R87.810 CERVICAL HIGH RISK HUMAN PAPILLOMAVIRUS (HPV) DNA TEST POSITIVE: ICD-10-CM

## 2024-11-15 PROCEDURE — G0101: CPT

## 2024-11-15 RX ORDER — SEMAGLUTIDE 1.7 MG/.75ML
INJECTION, SOLUTION SUBCUTANEOUS
Refills: 0 | Status: ACTIVE | COMMUNITY

## 2024-11-15 RX ORDER — FLUTICASONE PROPIONATE AND SALMETEROL 50; 500 UG/1; UG/1
POWDER RESPIRATORY (INHALATION)
Refills: 0 | Status: ACTIVE | COMMUNITY

## 2024-11-22 LAB
CYTOLOGY CVX/VAG DOC THIN PREP: ABNORMAL
HPV HIGH+LOW RISK DNA PNL CVX: DETECTED

## 2024-11-27 ENCOUNTER — NON-APPOINTMENT (OUTPATIENT)
Age: 49
End: 2024-11-27

## 2025-02-05 ENCOUNTER — APPOINTMENT (OUTPATIENT)
Dept: OBGYN | Facility: CLINIC | Age: 50
End: 2025-02-05
Payer: MEDICARE

## 2025-02-05 VITALS
HEIGHT: 62 IN | SYSTOLIC BLOOD PRESSURE: 120 MMHG | WEIGHT: 142 LBS | BODY MASS INDEX: 26.13 KG/M2 | DIASTOLIC BLOOD PRESSURE: 78 MMHG

## 2025-02-05 DIAGNOSIS — R87.810 CERVICAL HIGH RISK HUMAN PAPILLOMAVIRUS (HPV) DNA TEST POSITIVE: ICD-10-CM

## 2025-02-05 LAB
HCG UR QL: NEGATIVE
QUALITY CONTROL: YES

## 2025-02-05 PROCEDURE — 57456 ENDOCERV CURETTAGE W/SCOPE: CPT

## 2025-02-05 PROCEDURE — 81025 URINE PREGNANCY TEST: CPT

## 2025-02-11 ENCOUNTER — TRANSCRIPTION ENCOUNTER (OUTPATIENT)
Age: 50
End: 2025-02-11

## 2025-02-11 LAB — CORE LAB BIOPSY: NORMAL

## 2025-06-30 ENCOUNTER — RESULT REVIEW (OUTPATIENT)
Age: 50
End: 2025-06-30

## 2025-07-11 ENCOUNTER — APPOINTMENT (OUTPATIENT)
Dept: VASCULAR SURGERY | Facility: CLINIC | Age: 50
End: 2025-07-11

## 2025-07-11 ENCOUNTER — APPOINTMENT (OUTPATIENT)
Dept: VASCULAR SURGERY | Facility: CLINIC | Age: 50
End: 2025-07-11
Payer: MEDICARE

## 2025-07-11 PROBLEM — I83.819 VARICOSE VEINS WITH PAIN: Status: ACTIVE | Noted: 2025-07-11

## 2025-07-11 PROCEDURE — 93970 EXTREMITY STUDY: CPT

## 2025-07-11 PROCEDURE — 99204 OFFICE O/P NEW MOD 45 MIN: CPT

## 2025-09-04 ENCOUNTER — TRANSCRIPTION ENCOUNTER (OUTPATIENT)
Age: 50
End: 2025-09-04

## 2025-09-04 DIAGNOSIS — Z12.31 ENCOUNTER FOR SCREENING MAMMOGRAM FOR MALIGNANT NEOPLASM OF BREAST: ICD-10-CM
